# Patient Record
Sex: FEMALE
[De-identification: names, ages, dates, MRNs, and addresses within clinical notes are randomized per-mention and may not be internally consistent; named-entity substitution may affect disease eponyms.]

---

## 2018-11-28 ENCOUNTER — RESULT REVIEW (OUTPATIENT)
Age: 38
End: 2018-11-28

## 2019-02-09 PROBLEM — Z00.00 ENCOUNTER FOR PREVENTIVE HEALTH EXAMINATION: Status: ACTIVE | Noted: 2019-02-09

## 2019-03-11 ENCOUNTER — APPOINTMENT (OUTPATIENT)
Dept: ENDOCRINOLOGY | Facility: CLINIC | Age: 39
End: 2019-03-11
Payer: MEDICAID

## 2019-03-11 VITALS
BODY MASS INDEX: 22.2 KG/M2 | HEART RATE: 92 BPM | TEMPERATURE: 97.6 F | DIASTOLIC BLOOD PRESSURE: 63 MMHG | SYSTOLIC BLOOD PRESSURE: 107 MMHG | WEIGHT: 130 LBS | OXYGEN SATURATION: 98 % | HEIGHT: 64 IN | RESPIRATION RATE: 16 BRPM

## 2019-03-11 DIAGNOSIS — Z86.39 PERSONAL HISTORY OF OTHER ENDOCRINE, NUTRITIONAL AND METABOLIC DISEASE: ICD-10-CM

## 2019-03-11 DIAGNOSIS — Z78.9 OTHER SPECIFIED HEALTH STATUS: ICD-10-CM

## 2019-03-11 PROCEDURE — 99204 OFFICE O/P NEW MOD 45 MIN: CPT

## 2019-03-11 RX ORDER — ELECTROLYTES/DEXTROSE
SOLUTION, ORAL ORAL
Refills: 0 | Status: ACTIVE | COMMUNITY

## 2019-03-11 NOTE — ASSESSMENT
[FreeTextEntry1] : Patient is clinically euthyroid'\par She had a FNA biopsy non diagnostic results and she did not wanted to repeat it.\par Will order thyroid function tests\par Will order an US thyroid

## 2019-03-11 NOTE — HISTORY OF PRESENT ILLNESS
[FreeTextEntry1] : Patient comes to the office with a history of a thyroid nodule for 1 year. Apparently, the nodule has not change in size. Denies trouble swallowing or breathing. No neck pain. No hoarseness. No history of neck radiation. She had a FNA biopsy of the nodule last year the results was uncertain. She has no family history of thyroid disease. She has no family history of thyroid cancer. The patient also does not have history of hypothyroidism. She feels tired, sleepy, with some cold intolerance, constipation, dryness of the skin, hair loss. Her periods are normal. She has not gained weight. \par . .\par

## 2019-04-15 ENCOUNTER — APPOINTMENT (OUTPATIENT)
Dept: ENDOCRINOLOGY | Facility: CLINIC | Age: 39
End: 2019-04-15
Payer: MEDICAID

## 2019-04-15 VITALS
BODY MASS INDEX: 21.85 KG/M2 | HEART RATE: 97 BPM | SYSTOLIC BLOOD PRESSURE: 104 MMHG | HEIGHT: 64 IN | OXYGEN SATURATION: 99 % | WEIGHT: 128 LBS | TEMPERATURE: 98.7 F | DIASTOLIC BLOOD PRESSURE: 70 MMHG | RESPIRATION RATE: 16 BRPM

## 2019-04-15 PROCEDURE — 99213 OFFICE O/P EST LOW 20 MIN: CPT

## 2019-04-15 NOTE — PHYSICAL EXAM
[Alert] : alert [Normal Sclera/Conjunctiva] : normal sclera/conjunctiva [No Acute Distress] : no acute distress [Normal Outer Ear/Nose] : the ears and nose were normal in appearance [PERRL] : pupils equal, round and reactive to light [No Neck Mass] : no neck mass was observed [Normal Hearing] : hearing was normal [Thyroid Not Enlarged] : the thyroid was not enlarged [No Respiratory Distress] : no respiratory distress [Normal Rate and Effort] : normal respiratory rhythm and effort [Normal PMI] : the apical impulse was normal [Normal Rate] : heart rate was normal  [Carotids Normal] : carotid pulses were normal with no bruits [Normal Reflexes] : deep tendon reflexes were 2+ and symmetric [No Motor Deficits] : the motor exam was normal

## 2019-04-15 NOTE — HISTORY OF PRESENT ILLNESS
[FreeTextEntry1] : The patient is doing well. She denies any trouble swallowing, neck pain, heat intolerance, palpitations, hoarseness, fatigue. Her weight is decreased. The TSH was normal, the Free T4 was normal. . Her calcium levels remains normal. The US thyroid revealed the nodule smaller. \par

## 2019-04-15 NOTE — ASSESSMENT
[FreeTextEntry1] : Patient is clinically euthyroid\par The thyroid nodule is smaller\par Will continue observation\par Will repeat the US thyroid and thyroid tests in 6 months

## 2020-02-24 ENCOUNTER — APPOINTMENT (OUTPATIENT)
Dept: ENDOCRINOLOGY | Facility: CLINIC | Age: 40
End: 2020-02-24
Payer: MEDICAID

## 2020-02-24 VITALS
HEART RATE: 81 BPM | OXYGEN SATURATION: 98 % | HEIGHT: 64 IN | WEIGHT: 129 LBS | BODY MASS INDEX: 22.02 KG/M2 | RESPIRATION RATE: 16 BRPM | TEMPERATURE: 97.2 F | SYSTOLIC BLOOD PRESSURE: 100 MMHG | DIASTOLIC BLOOD PRESSURE: 66 MMHG

## 2020-02-24 DIAGNOSIS — E04.1 NONTOXIC SINGLE THYROID NODULE: ICD-10-CM

## 2020-02-24 PROCEDURE — 99214 OFFICE O/P EST MOD 30 MIN: CPT

## 2020-02-24 NOTE — ASSESSMENT
[FreeTextEntry1] : The patient has non enlarging small thyroid nodules\par A FNA biopsy done was non conclusive\par Will repeat the thyroid tests\par Will repeat the US thyroid\par Patient will call miranda for the results

## 2020-02-24 NOTE — HISTORY OF PRESENT ILLNESS
[FreeTextEntry1] : The patient feels well, she is asymptomatic. Denies neck pain, trouble swallowing or breathing. No hoarseness. She denies feeling tired, sleepy, having cold intolerance, constipation, dryness of the skin, hair loss. She has not/gained weight. She has been taking Levothyroxine regularly. No history of neck radiation. The US thyroid 4/19 disclosed decreased in the size of the nodule. No recent US thyroid. She had a non diagnostic FNA biopsy in 2018. She no family history of thyroid cancer. \par \par

## 2020-02-24 NOTE — PHYSICAL EXAM
[Alert] : alert [Normal Sclera/Conjunctiva] : normal sclera/conjunctiva [No Acute Distress] : no acute distress [Normal Outer Ear/Nose] : the ears and nose were normal in appearance [PERRL] : pupils equal, round and reactive to light [Normal Hearing] : hearing was normal [No Neck Mass] : no neck mass was observed [Thyroid Not Enlarged] : the thyroid was not enlarged [Normal Rate and Effort] : normal respiratory rhythm and effort [No Respiratory Distress] : no respiratory distress [Normal PMI] : the apical impulse was normal [Carotids Normal] : carotid pulses were normal with no bruits [Normal Rate] : heart rate was normal  [No Motor Deficits] : the motor exam was normal [Normal Reflexes] : deep tendon reflexes were 2+ and symmetric

## 2020-02-27 LAB
ANION GAP SERPL CALC-SCNC: 13 MMOL/L
BUN SERPL-MCNC: 9 MG/DL
CALCIUM SERPL-MCNC: 8.8 MG/DL
CHLORIDE SERPL-SCNC: 104 MMOL/L
CO2 SERPL-SCNC: 24 MMOL/L
CREAT SERPL-MCNC: 0.53 MG/DL
GLUCOSE SERPL-MCNC: 90 MG/DL
POTASSIUM SERPL-SCNC: 4 MMOL/L
SODIUM SERPL-SCNC: 141 MMOL/L
T4 FREE SERPL-MCNC: 1.4 NG/DL
TSH SERPL-ACNC: 2.69 UIU/ML